# Patient Record
Sex: MALE | Race: WHITE
[De-identification: names, ages, dates, MRNs, and addresses within clinical notes are randomized per-mention and may not be internally consistent; named-entity substitution may affect disease eponyms.]

---

## 2018-03-30 ENCOUNTER — HOSPITAL ENCOUNTER (EMERGENCY)
Dept: HOSPITAL 74 - JER | Age: 53
Discharge: LEFT BEFORE DISCHARGE | End: 2018-03-30
Payer: COMMERCIAL

## 2018-03-30 VITALS — BODY MASS INDEX: 32.5 KG/M2

## 2018-03-30 VITALS — HEART RATE: 97 BPM | TEMPERATURE: 98.1 F | DIASTOLIC BLOOD PRESSURE: 77 MMHG | SYSTOLIC BLOOD PRESSURE: 136 MMHG

## 2018-03-30 DIAGNOSIS — R55: Primary | ICD-10-CM

## 2018-03-30 DIAGNOSIS — Z21: ICD-10-CM

## 2018-03-30 LAB
ALBUMIN SERPL-MCNC: 3.4 G/DL (ref 3.4–5)
ALP SERPL-CCNC: 78 U/L (ref 45–117)
ALT SERPL-CCNC: 17 U/L (ref 12–78)
ANION GAP SERPL CALC-SCNC: 4 MMOL/L (ref 8–16)
AST SERPL-CCNC: 19 U/L (ref 15–37)
BASOPHILS # BLD: 0.7 % (ref 0–2)
BILIRUB SERPL-MCNC: 0.2 MG/DL (ref 0.2–1)
BUN SERPL-MCNC: 14 MG/DL (ref 7–18)
CALCIUM SERPL-MCNC: 8.3 MG/DL (ref 8.5–10.1)
CHLORIDE SERPL-SCNC: 109 MMOL/L (ref 98–107)
CO2 SERPL-SCNC: 27 MMOL/L (ref 21–32)
CREAT SERPL-MCNC: 0.9 MG/DL (ref 0.7–1.3)
DEPRECATED RDW RBC AUTO: 16.3 % (ref 11.9–15.9)
EOSINOPHIL # BLD: 2.7 % (ref 0–4.5)
GLUCOSE SERPL-MCNC: 64 MG/DL (ref 74–106)
HCT VFR BLD CALC: 36.1 % (ref 35.4–49)
HGB BLD-MCNC: 12.3 GM/DL (ref 11.7–16.9)
INR BLD: 1.04 (ref 0.82–1.09)
LYMPHOCYTES # BLD: 12.6 % (ref 8–40)
MAGNESIUM SERPL-MCNC: 2.3 MG/DL (ref 1.8–2.4)
MCH RBC QN AUTO: 28.7 PG (ref 25.7–33.7)
MCHC RBC AUTO-ENTMCNC: 34.2 G/DL (ref 32–35.9)
MCV RBC: 83.9 FL (ref 80–96)
MONOCYTES # BLD AUTO: 7 % (ref 3.8–10.2)
NEUTROPHILS # BLD: 77 % (ref 42.8–82.8)
PLATELET # BLD AUTO: 187 K/MM3 (ref 134–434)
PMV BLD: 7.4 FL (ref 7.5–11.1)
POTASSIUM SERPLBLD-SCNC: 4.1 MMOL/L (ref 3.5–5.1)
PROT SERPL-MCNC: 7.4 G/DL (ref 6.4–8.2)
PT PNL PPP: 11.7 SEC (ref 9.98–11.88)
RBC # BLD AUTO: 4.3 M/MM3 (ref 4–5.6)
SODIUM SERPL-SCNC: 140 MMOL/L (ref 136–145)
WBC # BLD AUTO: 4.2 K/MM3 (ref 4–10)

## 2018-03-30 NOTE — PDOC
History of Present Illness





- General


Chief Complaint: Seizure


Stated Complaint: SEIZURE


Time Seen by Provider: 03/30/18 17:23


History Source: Patient


Exam Limitations: No Limitations





- History of Present Illness


Initial Comments: 





03/30/18 18:13


Patient is a 52M with history of hypoglycemic episodes causing syncope/seizures

, gastric bypass, HIV (on no medication, says his last viral load was 

undetectable) here today complaining of seizure. EMS reports that witnesses at 

patient's work describe a full tonic/clonic seizure lasting less than five 

minutes. Patient was confused afterwards with post-ictal period. EMS reports 

fingerstick glucose was 68, and that patient improved after getting D10. 

Patient states that he normally has some sort of prodromal symptoms before his 

hypoglycemic episodes, and that these have not happened for a year. He does not 

currently see a neurologist. Denies nausea, vomiting, fevers, chills, chest pain

, shortness of breath, headache.





Past History





- Past Medical History


Allergies/Adverse Reactions: 


 Allergies











Allergy/AdvReac Type Severity Reaction Status Date / Time


 


No Known Allergies Allergy   Verified 03/30/18 17:21











Home Medications: 


Ambulatory Orders





NK [No Known Home Medication]  03/30/18 








COPD: No





- Surgical History


Abdominal Surgery: Yes (gastric bypass)





- Suicide/Smoking/Psychosocial Hx


Smoking History: Never smoked


Have you smoked in the past 12 months: No


Number of Cigarettes Smoked Daily: 20


Information on smoking cessation initiated: No


Hx Alcohol Use: No


Drug/Substance Use Hx: No


Substance Use Type: None





**Review of Systems





- Review of Systems


Comments:: 





03/30/18 18:15


GENERAL/CONSTITUTIONAL: No fever or chills. No weakness.


HEAD, EYES, EARS, NOSE AND THROAT: No change in vision. No sore throat.


CARDIOVASCULAR: No chest pain or shortness of breath


RESPIRATORY: No cough, wheezing, or hemoptysis.


GASTROINTESTINAL: No nausea, vomiting, diarrhea or constipation.


GENITOURINARY: No dysuria, frequency, or change in urination.


MUSCULOSKELETAL: No joint or muscle swelling or pain. No neck or back pain.


SKIN: No rash


NEUROLOGIC: No headache, vertigo, or change in strength/sensation. Positive for 

loss of consciousness


HEMATOLOGIC/LYMPHATIC: No anemia, easy bleeding, or history of blood clots.


ALLERGIC/IMMUNOLOGIC: No hives or skin allergy.











*Physical Exam





- Vital Signs


 Last Vital Signs











Temp Pulse Resp BP Pulse Ox


 


 98.1 F   97 H  18   136/77   99 


 


 03/30/18 17:13  03/30/18 17:13  03/30/18 17:13  03/30/18 17:13  03/30/18 17:13














- Physical Exam


Comments: 





03/30/18 18:16


GENERAL: Awake, alert, and fully oriented, in no acute distress


HEAD: No signs of trauma, normocephalic, atraumatic


EYES: PERRLA, EOMI, sclera anicteric, conjunctiva clear


ENT: Auricles normal inspection, hearing grossly normal, nares patent, 

oropharynx clear without


exudates. Moist mucosa


NECK: Normal ROM, supple, no lymphadenopathy, JVD, or masses


LUNGS: No distress, speaks full sentences, clear to auscultation bilaterally


HEART: Regular rate and rhythm, normal S1 and S2, no murmurs, rubs or gallops, 

peripheral pulses normal and equal bilaterally.


ABDOMEN: Soft, nontender, normoactive bowel sounds.  No guarding, no rebound.  

No masses


EXTREMITIES: Normal inspection, Normal range of motion, no edema.  No clubbing 

or cyanosis.


NEUROLOGICAL: Cranial nerves II through XII grossly intact.  Normal speech, no 

focal sensorimotor deficits


SKIN: Warm, Dry, normal turgor, no rashes or lesions noted.








ED Treatment Course





- LABORATORY


CBC & Chemistry Diagram: 


 03/30/18 17:53





 03/30/18 17:53





- ADDITIONAL ORDERS


Additional order review: 


 











  03/30/18





  17:53


 


RBC  4.30


 


MCV  83.9


 


MCHC  34.2


 


RDW  16.3 H


 


MPV  7.4 L


 


Neutrophils %  77.0


 


Lymphocytes %  12.6


 


Monocytes %  7.0


 


Eosinophils %  2.7


 


Basophils %  0.7














- RADIOLOGY


Radiology Studies Ordered: 














 Category Date Time Status


 


 HEAD CT WITHOUT CONTRAST [CT] Stat CT Scan  03/30/18 17:42 Ordered


 


 CHEST PA & LAT [RAD] Stat Radiology  03/30/18 17:41 Ordered














Medical Decision Making





- Medical Decision Making





03/30/18 18:16


Patient is 52M with history of hypoglycemic events, gastric bypass, HIV here 

today complaining of seizure. Vital signs stable and normal. Patient back at 

baseline. Do not believe that patient had hypoglycemic episode, patient's sugar 

level not low enough to typically cause seizure. On re-evaluation patient 

states that he did have prodromal symptom, but concerned that patient is trying 

to go home faster. DDx includes, but is not limited to: metabolic derangement, 

mass, electrolyte abnormalities. Will evaluate with fingerstick, cbc, cmp, trop

, ekg, pt/inr, ct head.


03/30/18 19:03


 Laboratory Tests











  03/30/18 03/30/18





  17:53 17:53


 


WBC  4.2 


 


Hgb  12.3 


 


Hct  36.1 


 


Plt Count  187 


 


Random Glucose   64 L


 


Troponin I   < 0.02








CBC normal. Glucose 64. Troponin undetectable. Concerning glucose due to patient

's baseline at 68 and receiving D10. Patient given orange juice. Reassessed, 

patient remains at baseline.





EKG shows normal sinus rhythm with rate of 69. No st elevation/depression. No 

significant t wave abnormalities. Normal axis. Normal QRS/QTc/WV intervals.


03/30/18 19:15


Signed out to Dr Wilson.





*DC/Admit/Observation/Transfer


Diagnosis at time of Disposition: 


 Seizure








- Discharge Dispostion


Condition at time of disposition: Stable





- Referrals


Referrals: 


ON STAFF,NOT [Primary Care Provider] - 





- Patient Instructions





- Post Discharge Activity

## 2018-03-30 NOTE — PDOC
*Physical Exam





- Vital Signs


 Last Vital Signs











Temp Pulse Resp BP Pulse Ox


 


 98.1 F   97 H  18   136/77   99 


 


 03/30/18 17:13  03/30/18 17:13  03/30/18 17:13  03/30/18 17:13  03/30/18 17:13














ED Treatment Course





- LABORATORY


CBC & Chemistry Diagram: 


 03/30/18 17:53





 03/30/18 17:53





- ADDITIONAL ORDERS


Additional order review: 


 Laboratory  Results











  03/30/18 03/30/18





  17:53 17:53


 


PT with INR   11.70


 


INR   1.04


 


Sodium  140 


 


Potassium  4.1 


 


Chloride  109 H 


 


Carbon Dioxide  27 


 


Anion Gap  4 L 


 


BUN  14 


 


Creatinine  0.9 


 


Creat Clearance w eGFR  > 60 


 


Random Glucose  64 L 


 


Calcium  8.3 L 


 


Magnesium  2.3 


 


Total Bilirubin  0.2 


 


AST  19 


 


ALT  17 


 


Alkaline Phosphatase  78 


 


Creatine Kinase  258 


 


Creatine Kinase Index  0.9 


 


CK-MB (CK-2)  2.550 


 


Troponin I  < 0.02 


 


Total Protein  7.4 


 


Albumin  3.4 








 











  03/30/18





  17:53


 


RBC  4.30


 


MCV  83.9


 


MCHC  34.2


 


RDW  16.3 H


 


MPV  7.4 L


 


Neutrophils %  77.0


 


Lymphocytes %  12.6


 


Monocytes %  7.0


 


Eosinophils %  2.7


 


Basophils %  0.7














Medical Decision Making





- Medical Decision Making





03/30/18 19:41


The patient was signed out to me by Dr. Morse, day team.





The patient is a 52M with a PMH of hypoglycemia who presents after having a 

seizure and being found hypoglycemic. Pending repeat fingerstick and CT head 

imaging.





03/30/18 19:53


Repeat fingerstick is 158. Pending CT head.





03/30/18 20:25


Pt is desiring to leave AMA. I have instructed him that getting the CT head 

would be beneficial to our management and understanding of his presentation and 

if he had a possible brain bleed. The patient understands and signed the AMA 

form.





*DC/Admit/Observation/Transfer


Diagnosis at time of Disposition: 


 Seizure








- Discharge Dispostion


Disposition: AGAINST MEDICAL ADVICE


Condition at time of disposition: Stable





- Referrals





- Patient Instructions





- Post Discharge Activity

## 2018-03-30 NOTE — PDOC
Attending Attestation





- Resident


Resident Name: Ramon Morse





- ED Attending Attestation


I have performed the following: I have examined & evaluated the patient, The 

case was reviewed & discussed with the resident, I agree w/resident's findings 

& plan, Exceptions are as noted





- HPI


HPI: 





03/30/18 18:36


Mr Huff is a 53 yo M h/o HIV (VL UD, not currently on HAART), h/o 

hypoglycemic episodes causing syncope/seizures (last episode 10 years ago), 

gastric bypass, brought in by EMS s/p syncope vs seizure. 


Per witnesses, generalized tonic/clonic seizure lasting less than five minutes. 


(+) post ictal 


FS on scene - 68 


Pt reportedly improved after getting dextrose


He does not currently see a neurologist. 


Denies nausea, vomiting, fevers, chills, chest pain, shortness of breath, 

headache. 





- Physicial Exam


PE: 





03/30/18 18:38


GENERAL: Awake, alert, and fully oriented, in no acute distress


HEAD: No signs of trauma, normocephalic, atraumatic


EYES: PERRLA, EOMI, sclera anicteric, conjunctiva clear


ENT: Auricles normal inspection, hearing grossly normal, nares patent, 

oropharynx clear without


exudates. Moist mucosa


NECK: Normal ROM, supple, no lymphadenopathy, JVD, or masses


LUNGS: No distress, speaks full sentences, clear to auscultation bilaterally


HEART: Regular rate and rhythm, normal S1 and S2, no murmurs, rubs or gallops, 

peripheral pulses normal and equal bilaterally.


ABDOMEN: Soft, nontender, normoactive bowel sounds.  No guarding, no rebound.  

No masses


EXTREMITIES: Normal inspection, Normal range of motion, no edema.  No clubbing 

or cyanosis.


NEUROLOGICAL: Cranial nerves II through XII grossly intact.  Normal speech, no 

focal sensorimotor deficits


SKIN: Warm, Dry, normal turgor, no rashes or lesions noted.





- Medical Decision Making





03/30/18 18:38


53 yo M presenting s/p likely seizure 


Will do:


Labs


CT head


Re Assess


Anticipate discharge


Follow up with Neuro


Patient signed out to overnight attending and resident

## 2018-03-31 NOTE — EKG
Test Reason : 

Blood Pressure : ***/*** mmHG

Vent. Rate : 069 BPM     Atrial Rate : 069 BPM

   P-R Int : 176 ms          QRS Dur : 106 ms

    QT Int : 424 ms       P-R-T Axes : 049 064 060 degrees

   QTc Int : 454 ms

 

*** POOR DATA QUALITY, INTERPRETATION MAY BE ADVERSELY AFFECTED

NORMAL SINUS RHYTHM

LEFT ATRIAL ENLARGEMENT

BORDERLINE ECG

NO PREVIOUS ECGS AVAILABLE

Confirmed by MD HARMONY, JASON ([id number]) on 3/31/2018 6:06:28 PM

 

Referred By:             Confirmed By:JASON ANTUNEZ MD